# Patient Record
Sex: FEMALE | Race: WHITE | NOT HISPANIC OR LATINO | URBAN - METROPOLITAN AREA
[De-identification: names, ages, dates, MRNs, and addresses within clinical notes are randomized per-mention and may not be internally consistent; named-entity substitution may affect disease eponyms.]

---

## 2017-06-13 ENCOUNTER — OFFICE (OUTPATIENT)
Dept: URBAN - METROPOLITAN AREA CLINIC 10 | Facility: CLINIC | Age: 82
End: 2017-06-13
Payer: MEDICARE

## 2017-06-13 VITALS
HEIGHT: 58 IN | WEIGHT: 116 LBS | HEART RATE: 86 BPM | SYSTOLIC BLOOD PRESSURE: 116 MMHG | DIASTOLIC BLOOD PRESSURE: 61 MMHG

## 2017-06-13 DIAGNOSIS — N39.0 URINARY TRACT INFECTION, SITE NOT SPECIFIED: ICD-10-CM

## 2017-06-13 DIAGNOSIS — R19.7 DIARRHEA, UNSPECIFIED: ICD-10-CM

## 2017-06-13 LAB
CBC COMPLETE BLOOD COUNT W/O DIFF: HEMATOCRIT: 35.2 % — LOW (ref 36–48)
CBC COMPLETE BLOOD COUNT W/O DIFF: HEMOGLOBIN: 11.6 G/DL — LOW (ref 12–16)
CBC COMPLETE BLOOD COUNT W/O DIFF: MCH: 31.2 PG (ref 25–35)
CBC COMPLETE BLOOD COUNT W/O DIFF: MCHC: 33 % (ref 30–38)
CBC COMPLETE BLOOD COUNT W/O DIFF: MCV: 94.6 FL (ref 78–102)
CBC COMPLETE BLOOD COUNT W/O DIFF: PLATELET COUNT: 265 K/UL (ref 150–450)
CBC COMPLETE BLOOD COUNT W/O DIFF: RBC DISTRIBUTION WIDTH: 13.2 % (ref 11.5–16)
CBC COMPLETE BLOOD COUNT W/O DIFF: RED BLOOD CELL COUNT: 3.72 M/UL — LOW (ref 4–5.5)
CBC COMPLETE BLOOD COUNT W/O DIFF: WHITE BLOOD CELL COUNT: 5.1 K/UL (ref 4–11)

## 2017-06-13 PROCEDURE — 99214 OFFICE O/P EST MOD 30 MIN: CPT

## 2017-06-13 PROCEDURE — G8427 DOCREV CUR MEDS BY ELIG CLIN: HCPCS

## 2017-06-13 NOTE — SERVICEHPINOTES
.  The  the patient is an 83-year-old white female who has had diarrhea for the past couple of years.  Apparently this started around the time that her  .  She has most of the diarrhea in the morning.  She had been taking Bentyl which helped to a degree but she has run out of it.  She has been  having 3-4 bowel movements per day but it varies.  She does have occasional constipation if she takes too much Lomotil.  She has had no melena or hematochezia. The diarrhea does not seem to be related to any particular food intake.  She does admit to bloating on occasion.  There has been no nausea or vomiting. She denies any travel lately.  She has had no fever.  Family history is negative.  She does take a lot of antibiotics for urinary tract infections.  She is scheduled to see a urologist soon.  She does complains of burning in the rectum with her bowel movements.

## 2017-06-15 LAB
CLOSTRIDIUM DIFFICILE TOXIN: CD TOXIN: (no result)
FECAL LEUKOCYTES: STOOL WBC: (no result)